# Patient Record
Sex: FEMALE | Race: WHITE | NOT HISPANIC OR LATINO | Employment: FULL TIME | URBAN - METROPOLITAN AREA
[De-identification: names, ages, dates, MRNs, and addresses within clinical notes are randomized per-mention and may not be internally consistent; named-entity substitution may affect disease eponyms.]

---

## 2024-01-31 ENCOUNTER — PATIENT MESSAGE (OUTPATIENT)
Dept: ENDOCRINOLOGY | Facility: CLINIC | Age: 19
End: 2024-01-31
Payer: COMMERCIAL

## 2024-01-31 DIAGNOSIS — Z79.899 TRANSGENDER MAN ON HORMONE THERAPY: Primary | ICD-10-CM

## 2024-01-31 DIAGNOSIS — F64.0 TRANSGENDER MAN ON HORMONE THERAPY: Primary | ICD-10-CM

## 2024-01-31 RX ORDER — TESTOSTERONE CYPIONATE 200 MG/ML
100 INJECTION, SOLUTION INTRAMUSCULAR
Qty: 4 ML | Refills: 5 | OUTPATIENT
Start: 2024-01-31 | End: 2025-01-01

## 2024-02-01 RX ORDER — TESTOSTERONE CYPIONATE 200 MG/ML
100 INJECTION, SOLUTION INTRAMUSCULAR
Qty: 4 ML | Refills: 4 | Status: SHIPPED | OUTPATIENT
Start: 2024-02-01 | End: 2024-03-12 | Stop reason: SDUPTHER

## 2024-03-12 DIAGNOSIS — Z79.899 TRANSGENDER MAN ON HORMONE THERAPY: ICD-10-CM

## 2024-03-12 DIAGNOSIS — F64.0 TRANSGENDER MAN ON HORMONE THERAPY: ICD-10-CM

## 2024-03-13 RX ORDER — TESTOSTERONE CYPIONATE 200 MG/ML
100 INJECTION, SOLUTION INTRAMUSCULAR
Qty: 4 ML | Refills: 2 | Status: SHIPPED | OUTPATIENT
Start: 2024-03-13 | End: 2024-05-13 | Stop reason: SDUPTHER

## 2024-05-13 DIAGNOSIS — Z79.899 TRANSGENDER MAN ON HORMONE THERAPY: ICD-10-CM

## 2024-05-13 DIAGNOSIS — F64.0 TRANSGENDER MAN ON HORMONE THERAPY: ICD-10-CM

## 2024-05-15 ENCOUNTER — PATIENT MESSAGE (OUTPATIENT)
Dept: ENDOCRINOLOGY | Facility: CLINIC | Age: 19
End: 2024-05-15
Payer: COMMERCIAL

## 2024-05-16 RX ORDER — TESTOSTERONE CYPIONATE 200 MG/ML
100 INJECTION, SOLUTION INTRAMUSCULAR
Qty: 4 ML | Refills: 0 | Status: SHIPPED | OUTPATIENT
Start: 2024-05-16 | End: 2024-06-05 | Stop reason: SDUPTHER

## 2024-06-05 ENCOUNTER — OFFICE VISIT (OUTPATIENT)
Dept: ENDOCRINOLOGY | Facility: CLINIC | Age: 19
End: 2024-06-05
Payer: COMMERCIAL

## 2024-06-05 ENCOUNTER — LAB VISIT (OUTPATIENT)
Dept: LAB | Facility: HOSPITAL | Age: 19
End: 2024-06-05
Attending: INTERNAL MEDICINE
Payer: COMMERCIAL

## 2024-06-05 VITALS
DIASTOLIC BLOOD PRESSURE: 68 MMHG | WEIGHT: 113.88 LBS | BODY MASS INDEX: 19.44 KG/M2 | SYSTOLIC BLOOD PRESSURE: 110 MMHG | HEIGHT: 64 IN

## 2024-06-05 DIAGNOSIS — F39 MOOD DISORDER: ICD-10-CM

## 2024-06-05 DIAGNOSIS — F64.0 TRANSGENDER MAN ON HORMONE THERAPY: Primary | ICD-10-CM

## 2024-06-05 DIAGNOSIS — Z79.899 TRANSGENDER MAN ON HORMONE THERAPY: Primary | ICD-10-CM

## 2024-06-05 DIAGNOSIS — Z79.899 TRANSGENDER MAN ON HORMONE THERAPY: ICD-10-CM

## 2024-06-05 DIAGNOSIS — F64.0 TRANSGENDER MAN ON HORMONE THERAPY: ICD-10-CM

## 2024-06-05 LAB
ALBUMIN SERPL BCP-MCNC: 4.4 G/DL (ref 3.5–5.2)
ALP SERPL-CCNC: 64 U/L (ref 55–135)
ALT SERPL W/O P-5'-P-CCNC: 14 U/L (ref 10–44)
ANION GAP SERPL CALC-SCNC: 9 MMOL/L (ref 8–16)
AST SERPL-CCNC: 13 U/L (ref 10–40)
BILIRUB SERPL-MCNC: 0.4 MG/DL (ref 0.1–1)
BUN SERPL-MCNC: 12 MG/DL (ref 6–20)
CALCIUM SERPL-MCNC: 10 MG/DL (ref 8.7–10.5)
CHLORIDE SERPL-SCNC: 105 MMOL/L (ref 95–110)
CO2 SERPL-SCNC: 29 MMOL/L (ref 23–29)
CREAT SERPL-MCNC: 0.9 MG/DL (ref 0.5–1.4)
ERYTHROCYTE [DISTWIDTH] IN BLOOD BY AUTOMATED COUNT: 12.8 % (ref 11.5–14.5)
EST. GFR  (NO RACE VARIABLE): >60 ML/MIN/1.73 M^2
GLUCOSE SERPL-MCNC: 89 MG/DL (ref 70–110)
HCT VFR BLD AUTO: 44.6 % (ref 37–48.5)
HGB BLD-MCNC: 15.2 G/DL (ref 12–16)
MCH RBC QN AUTO: 31.1 PG (ref 27–31)
MCHC RBC AUTO-ENTMCNC: 34.1 G/DL (ref 32–36)
MCV RBC AUTO: 91 FL (ref 82–98)
PLATELET # BLD AUTO: 228 K/UL (ref 150–450)
PMV BLD AUTO: 10.8 FL (ref 9.2–12.9)
POTASSIUM SERPL-SCNC: 3.6 MMOL/L (ref 3.5–5.1)
PROT SERPL-MCNC: 7.2 G/DL (ref 6–8.4)
RBC # BLD AUTO: 4.88 M/UL (ref 4–5.4)
SODIUM SERPL-SCNC: 143 MMOL/L (ref 136–145)
TESTOST SERPL-MCNC: 590 NG/DL (ref 5–73)
WBC # BLD AUTO: 5.52 K/UL (ref 3.9–12.7)

## 2024-06-05 PROCEDURE — 1160F RVW MEDS BY RX/DR IN RCRD: CPT | Mod: CPTII,S$GLB,,

## 2024-06-05 PROCEDURE — 1159F MED LIST DOCD IN RCRD: CPT | Mod: CPTII,S$GLB,,

## 2024-06-05 PROCEDURE — 99214 OFFICE O/P EST MOD 30 MIN: CPT | Mod: S$GLB,,,

## 2024-06-05 PROCEDURE — 3074F SYST BP LT 130 MM HG: CPT | Mod: CPTII,S$GLB,,

## 2024-06-05 PROCEDURE — 85027 COMPLETE CBC AUTOMATED: CPT | Performed by: INTERNAL MEDICINE

## 2024-06-05 PROCEDURE — 99999 PR PBB SHADOW E&M-EST. PATIENT-LVL III: CPT | Mod: PBBFAC,,, | Performed by: INTERNAL MEDICINE

## 2024-06-05 PROCEDURE — 3078F DIAST BP <80 MM HG: CPT | Mod: CPTII,S$GLB,,

## 2024-06-05 PROCEDURE — 36415 COLL VENOUS BLD VENIPUNCTURE: CPT | Performed by: INTERNAL MEDICINE

## 2024-06-05 PROCEDURE — G2211 COMPLEX E/M VISIT ADD ON: HCPCS | Mod: S$GLB,,,

## 2024-06-05 PROCEDURE — 3008F BODY MASS INDEX DOCD: CPT | Mod: CPTII,S$GLB,,

## 2024-06-05 PROCEDURE — 84403 ASSAY OF TOTAL TESTOSTERONE: CPT | Performed by: INTERNAL MEDICINE

## 2024-06-05 PROCEDURE — 80053 COMPREHEN METABOLIC PANEL: CPT | Performed by: INTERNAL MEDICINE

## 2024-06-05 RX ORDER — TESTOSTERONE CYPIONATE 200 MG/ML
100 INJECTION, SOLUTION INTRAMUSCULAR
Qty: 4 ML | Refills: 5 | Status: SHIPPED | OUTPATIENT
Start: 2024-06-05 | End: 2024-06-07 | Stop reason: SDUPTHER

## 2024-06-05 NOTE — PROGRESS NOTES
"Subjective:      Patient ID: Wayne Lanza is a 18 y.o.    Chief Complaint:  gender     History of Present Illness  With regards to the gender incongruence care:    Has had name changed on 's license.    Gender identity - male     Pronouns:  he/him       Goals of therapy:  Deeper voice, increased body hair, increased facial hair    Sexual activity:  men and women          therapist at the time hormones were started: is aware is supportive     Gender Surgeries - none, but interested in mastectomy        Fertility concerns - not  interested    He started testosterone therapy in November of 2022   Current dose   Testosterone 100 mg subcu weekly (Fridays)  He gives himself the injections. He was previously administering in the abdomen, but switched to his legs due to scarring. Reports that scarring has resolved now that he rotates injections.    +voice changes and bottom growth  +mood is good   He is happy is doing this, no regret  Acne has improved    LMP: jan 2023          Social:  He lives in [facility]    Family -  aware and supportive. They are supportive of him using the insurance as long as he pays the costs  Relationship, orientation - in relationship nonbinary AFAB, partner is moving in with him  Dates both men and women     Housing - living in a house near his parent's house      Has been diagnosed with Anxiety and depression   - borderline personality disorder      New concerns today:     None     ROS:   As above    Objective:     /68   Ht 5' 4" (1.626 m)   Wt 51.6 kg (113 lb 13.9 oz)   BMI 19.55 kg/m²     Body mass index is 19.55 kg/m².      Physical Exam  Vitals reviewed.   Constitutional:       Appearance: Normal appearance.   Neck:      Comments: No goiter   Cardiovascular:      Rate and Rhythm: Normal rate.   Pulmonary:      Effort: Pulmonary effort is normal.   Abdominal:      General: Abdomen is flat.      Palpations: Abdomen is soft.      Tenderness: " There is no abdominal tenderness.      Comments: Negative lipohypertrophy, bruising, or scarring   Musculoskeletal:      Right lower leg: No edema.      Left lower leg: No edema.   Psychiatric:         Mood and Affect: Mood normal.       Lab Review:   Last labs were drawn 06/2023 [facility] [place] (scanned in media).    Assessment and Plan     Transgender man on hormone therapy  Transman: gender incongruence   Reviewed therapy, side effects (both wanted and unwanted), possible adverse outcomes, expectations, compliance.      Reviewed the need for contraception as testosterone is not a contraceptive if having vaginal sex with non-trans men         Will continue testosterone replacement therapy 100 mg q 1 week   Labs today      RTC in 12 months with labs   Noting  Nl hh/ for men is:     Hemoglobin 14.0-18.0 g/dL    Hematocrit 40.0-54.0 %        Healthy lifestyle stressed      Reviewed risks and benefits.  ? Possible increase cad  Expected increase in h/h, changes in lipids and body composition   Possible liver effects         Mood disorder  He does not regret doing this and he is excited about the changes      Follow up in about 1 year (around 6/5/2025) for in-person visit..    Vicki Briseno PA-C

## 2024-06-05 NOTE — ASSESSMENT & PLAN NOTE
Transman: gender incongruence   Reviewed therapy, side effects (both wanted and unwanted), possible adverse outcomes, expectations, compliance.      Reviewed the need for contraception as testosterone is not a contraceptive if having vaginal sex with non-trans men         Will continue testosterone replacement therapy 100 mg q 1 week   Labs today      RTC in 12 months with labs   Noting  Nl hh/ for men is:     Hemoglobin 14.0-18.0 g/dL    Hematocrit 40.0-54.0 %        Healthy lifestyle stressed      Reviewed risks and benefits.  ? Possible increase cad  Expected increase in h/h, changes in lipids and body composition   Possible liver effects

## 2024-06-05 NOTE — PROGRESS NOTES
To whom it may concern,     I, Yara Conn MD, am the physician of Wayne Lanza,   YOB: 2005, with whom I have a doctor/patient relationship and with whom I have treated since October of 2022 for gender dysphoria/incongruence.     Wayne Lanza has undergone appropriate and successful clinical treatment for gender transition to the gender of MALE. As these changes from hormonal therapy are irreversible, they should be legally recognized as MALE.     I declare under penalty of perjury under the laws of the [place] that the foregoing is true and correct.    If there are any questions or concerns please contact my office,     Yara Conn MD     [place] #[id number]  Chair of Endocrinology, [facility]  Senior Lecturer, [employer], [facility]. [facility], [address] [place] [address], 6th [place] [address]  O: [phone]  F: [phone]    Email: [email or web address]

## 2024-06-05 NOTE — LETTER
June 5, 2024    Wayne Lanza  [address]  [facility] [address]             [facility] 6th [address]  [address]  [place] [address]  Phone: [phone]  Fax: [phone] To whom it may concern,      I, Yara Conn MD, am the physician of Wayne Lanza,   YOB: 2005, with whom I have a doctor/patient relationship and with whom I have treated since October of 2022 for gender dysphoria/incongruence.      Wayne Lanza has undergone appropriate and successful clinical treatment for gender transition to the gender of MALE. As these changes from hormonal therapy are irreversible, they should be legally recognized as MALE.      I declare under penalty of perjury under the laws of the [place] that the foregoing is true and correct.     If there are any questions or concerns please contact my office,          Yara Conn MD      [place] #[id number]  Chair of Endocrinology, [facility]  Senior Lecturer, [employer], [facility]. [facility], [facility], 6th [place] [address]  O: [phone]  F: [phone]    Email: [email or web address]

## 2024-06-06 ENCOUNTER — TELEPHONE (OUTPATIENT)
Dept: ENDOCRINOLOGY | Facility: CLINIC | Age: 19
End: 2024-06-06
Payer: COMMERCIAL

## 2024-06-06 NOTE — TELEPHONE ENCOUNTER
----- Message from Prachi Rider sent at 6/5/2024 10:01 AM CDT -----  Regarding: Prescription  Contact: [place] [phone]  [facility] is calling to state the pharmacy doesn't have rx: testosterone cypionate (DEPOTESTOTERONE CYPIONATE) 200 mg/mL injection states script needs to be sent to another pharmacy please call     [facility] - [place], [place] - [address], [address]  [address], [address]  [place] [address]  Phone: [phone] Fax: [phone]

## 2024-06-06 NOTE — TELEPHONE ENCOUNTER
Spoke with patient informed him pharmacy requested state they do not carry medication.  Ask him to send a portal message with pharmacy he would like Rx forwarded to. Patient verbalized understanding.

## 2024-06-07 ENCOUNTER — PATIENT MESSAGE (OUTPATIENT)
Dept: ENDOCRINOLOGY | Facility: CLINIC | Age: 19
End: 2024-06-07
Payer: COMMERCIAL

## 2024-06-07 DIAGNOSIS — Z79.899 TRANSGENDER MAN ON HORMONE THERAPY: ICD-10-CM

## 2024-06-07 DIAGNOSIS — F64.0 TRANSGENDER MAN ON HORMONE THERAPY: ICD-10-CM

## 2024-06-10 RX ORDER — TESTOSTERONE CYPIONATE 200 MG/ML
100 INJECTION, SOLUTION INTRAMUSCULAR
Qty: 4 ML | Refills: 5 | Status: SHIPPED | OUTPATIENT
Start: 2024-06-10 | End: 2025-05-12

## 2024-06-22 DIAGNOSIS — F64.0 TRANSGENDER MAN ON HORMONE THERAPY: ICD-10-CM

## 2024-06-22 DIAGNOSIS — Z79.899 TRANSGENDER MAN ON HORMONE THERAPY: ICD-10-CM

## 2024-06-25 RX ORDER — TESTOSTERONE CYPIONATE 200 MG/ML
100 INJECTION, SOLUTION INTRAMUSCULAR
Qty: 4 ML | Refills: 5 | Status: SHIPPED | OUTPATIENT
Start: 2024-06-25 | End: 2025-05-27

## 2024-08-17 DIAGNOSIS — F64.0 TRANSGENDER MAN ON HORMONE THERAPY: ICD-10-CM

## 2024-08-17 DIAGNOSIS — Z79.899 TRANSGENDER MAN ON HORMONE THERAPY: ICD-10-CM

## 2024-08-19 RX ORDER — TESTOSTERONE CYPIONATE 200 MG/ML
100 INJECTION, SOLUTION INTRAMUSCULAR
Qty: 4 ML | Refills: 5 | Status: SHIPPED | OUTPATIENT
Start: 2024-08-19 | End: 2025-07-21

## 2024-08-31 DIAGNOSIS — F64.0 TRANSGENDER MAN ON HORMONE THERAPY: ICD-10-CM

## 2024-08-31 DIAGNOSIS — Z79.899 TRANSGENDER MAN ON HORMONE THERAPY: ICD-10-CM

## 2024-09-04 RX ORDER — TESTOSTERONE CYPIONATE 200 MG/ML
100 INJECTION, SOLUTION INTRAMUSCULAR
Qty: 4 ML | Refills: 5 | Status: SHIPPED | OUTPATIENT
Start: 2024-09-04 | End: 2025-08-06

## 2024-11-18 DIAGNOSIS — F64.0 TRANSGENDER MAN ON HORMONE THERAPY: ICD-10-CM

## 2024-11-18 DIAGNOSIS — Z79.899 TRANSGENDER MAN ON HORMONE THERAPY: ICD-10-CM

## 2024-11-19 RX ORDER — TESTOSTERONE CYPIONATE 200 MG/ML
100 INJECTION, SOLUTION INTRAMUSCULAR
Qty: 4 ML | Refills: 5 | Status: SHIPPED | OUTPATIENT
Start: 2024-11-19 | End: 2025-10-21

## 2025-03-01 DIAGNOSIS — Z79.899 TRANSGENDER MAN ON HORMONE THERAPY: ICD-10-CM

## 2025-03-01 DIAGNOSIS — F64.0 TRANSGENDER MAN ON HORMONE THERAPY: ICD-10-CM

## 2025-03-03 RX ORDER — TESTOSTERONE CYPIONATE 200 MG/ML
100 INJECTION, SOLUTION INTRAMUSCULAR
Qty: 4 ML | Refills: 5 | Status: SHIPPED | OUTPATIENT
Start: 2025-03-03 | End: 2026-02-02

## 2025-04-08 ENCOUNTER — PATIENT MESSAGE (OUTPATIENT)
Dept: ENDOCRINOLOGY | Facility: CLINIC | Age: 20
End: 2025-04-08
Payer: COMMERCIAL

## 2025-07-07 ENCOUNTER — LAB VISIT (OUTPATIENT)
Dept: LAB | Facility: HOSPITAL | Age: 20
End: 2025-07-07
Attending: INTERNAL MEDICINE
Payer: COMMERCIAL

## 2025-07-07 ENCOUNTER — OFFICE VISIT (OUTPATIENT)
Dept: ENDOCRINOLOGY | Facility: CLINIC | Age: 20
End: 2025-07-07
Payer: COMMERCIAL

## 2025-07-07 VITALS
SYSTOLIC BLOOD PRESSURE: 122 MMHG | BODY MASS INDEX: 22.4 KG/M2 | DIASTOLIC BLOOD PRESSURE: 70 MMHG | WEIGHT: 131.19 LBS | HEIGHT: 64 IN

## 2025-07-07 DIAGNOSIS — Z79.899 TRANSGENDER MAN ON HORMONE THERAPY: ICD-10-CM

## 2025-07-07 DIAGNOSIS — F64.0 TRANSGENDER MAN ON HORMONE THERAPY: ICD-10-CM

## 2025-07-07 LAB
ALBUMIN SERPL BCP-MCNC: 4.6 G/DL (ref 3.5–5.2)
ALP SERPL-CCNC: 57 UNIT/L (ref 40–150)
ALT SERPL W/O P-5'-P-CCNC: 98 UNIT/L (ref 10–44)
ANION GAP (OHS): 8 MMOL/L (ref 8–16)
AST SERPL-CCNC: 140 UNIT/L (ref 11–45)
BILIRUB SERPL-MCNC: 0.4 MG/DL (ref 0.1–1)
BUN SERPL-MCNC: 10 MG/DL (ref 6–20)
CALCIUM SERPL-MCNC: 9.1 MG/DL (ref 8.7–10.5)
CHLORIDE SERPL-SCNC: 104 MMOL/L (ref 95–110)
CO2 SERPL-SCNC: 26 MMOL/L (ref 23–29)
CREAT SERPL-MCNC: 0.7 MG/DL (ref 0.5–1.4)
ERYTHROCYTE [DISTWIDTH] IN BLOOD BY AUTOMATED COUNT: 12.3 % (ref 11.5–14.5)
GFR SERPLBLD CREATININE-BSD FMLA CKD-EPI: >60 ML/MIN/1.73/M2
GLUCOSE SERPL-MCNC: 98 MG/DL (ref 70–110)
HCT VFR BLD AUTO: 41.4 % (ref 40–54)
HGB BLD-MCNC: 13.8 GM/DL (ref 14–18)
MCH RBC QN AUTO: 30.3 PG (ref 27–31)
MCHC RBC AUTO-ENTMCNC: 33.3 G/DL (ref 32–36)
MCV RBC AUTO: 91 FL (ref 82–98)
PLATELET # BLD AUTO: 225 K/UL (ref 150–450)
PMV BLD AUTO: 11.1 FL (ref 9.2–12.9)
POTASSIUM SERPL-SCNC: 4 MMOL/L (ref 3.5–5.1)
PROT SERPL-MCNC: 6.7 GM/DL (ref 6–8.4)
RBC # BLD AUTO: 4.55 M/UL (ref 4.6–6.2)
SODIUM SERPL-SCNC: 138 MMOL/L (ref 136–145)
WBC # BLD AUTO: 5.07 K/UL (ref 3.9–12.7)

## 2025-07-07 PROCEDURE — 85027 COMPLETE CBC AUTOMATED: CPT

## 2025-07-07 PROCEDURE — G2211 COMPLEX E/M VISIT ADD ON: HCPCS | Mod: S$GLB,,, | Performed by: INTERNAL MEDICINE

## 2025-07-07 PROCEDURE — 36415 COLL VENOUS BLD VENIPUNCTURE: CPT

## 2025-07-07 PROCEDURE — 3074F SYST BP LT 130 MM HG: CPT | Mod: CPTII,S$GLB,, | Performed by: INTERNAL MEDICINE

## 2025-07-07 PROCEDURE — 3078F DIAST BP <80 MM HG: CPT | Mod: CPTII,S$GLB,, | Performed by: INTERNAL MEDICINE

## 2025-07-07 PROCEDURE — 3008F BODY MASS INDEX DOCD: CPT | Mod: CPTII,S$GLB,, | Performed by: INTERNAL MEDICINE

## 2025-07-07 PROCEDURE — 1160F RVW MEDS BY RX/DR IN RCRD: CPT | Mod: CPTII,S$GLB,, | Performed by: INTERNAL MEDICINE

## 2025-07-07 PROCEDURE — 99214 OFFICE O/P EST MOD 30 MIN: CPT | Mod: S$GLB,,, | Performed by: INTERNAL MEDICINE

## 2025-07-07 PROCEDURE — 84460 ALANINE AMINO (ALT) (SGPT): CPT

## 2025-07-07 PROCEDURE — 1159F MED LIST DOCD IN RCRD: CPT | Mod: CPTII,S$GLB,, | Performed by: INTERNAL MEDICINE

## 2025-07-07 PROCEDURE — 99999 PR PBB SHADOW E&M-EST. PATIENT-LVL III: CPT | Mod: PBBFAC,,, | Performed by: INTERNAL MEDICINE

## 2025-07-07 RX ORDER — TESTOSTERONE CYPIONATE 200 MG/ML
100 INJECTION, SOLUTION INTRAMUSCULAR
Qty: 4 ML | Refills: 5 | Status: SHIPPED | OUTPATIENT
Start: 2025-07-07 | End: 2026-06-08

## 2025-07-07 NOTE — PROGRESS NOTES
"Subjective:      Patient ID: Wayne Lanza is a 19 y.o.    Chief Complaint:  gender - last in clinic visit 07/07/2025      History of Present Illness  With regards to the gender incongruence care:    Has had name changed on 's license.  Has not changed gender marker -- wants to wait until he is 20      Gender identity - male     Pronouns:  he/him       Goals of therapy:  Deeper voice, increased body hair, increased facial hair    Sexual activity:  men and women          therapist at the time hormones were started: is aware is supportive     Gender Surgeries - none, but interested in mastectomy        Fertility concerns - not  interested    He started testosterone therapy in November of 2022   Current dose   Testosterone 100 mg subcu weekly (Fridays)  He gives himself the injection      +voice changes and bottom growth  +mood is good   He is happy is doing this, no regret  Acne has improved    LMP: jan 2023          Social:  He lives in [facility] [place]    Family -  aware and supportive.     Relationship, orientation - in relationship nonbinary AFAB    Dates both men and women   -- is aware of the need to use protection if having sexual activity with a person assigned male at birth    Housing - living in a house near his parent's house      Has been diagnosed with Anxiety and depression   - borderline personality disorder      New concerns today:     None     ROS:   As above    Objective:     /70 (BP Location: Right arm, Patient Position: Sitting)   Ht 5' 4" (1.626 m)   Wt 59.5 kg (131 lb 2.8 oz)   BMI 22.52 kg/m²     Body mass index is 22.52 kg/m².      Physical Exam  Vitals reviewed.   Constitutional:       Appearance: Normal appearance.   Neck:      Comments: No goiter   Cardiovascular:      Rate and Rhythm: Normal rate.   Pulmonary:      Effort: Pulmonary effort is normal.   Abdominal:      Palpations: Abdomen is soft.   Musculoskeletal:      Right lower leg: No edema. "      Left lower leg: No edema.   Psychiatric:         Mood and Affect: Mood normal.       Lab Review:   None     Assessment and Plan     Transgender man on hormone therapy  Transman: gender incongruence   Reviewed therapy, side effects (both wanted and unwanted), possible adverse outcomes, expectations, compliance.      Reviewed the need for contraception as testosterone is not a contraceptive if having vaginal sex with non-trans men         Will continue testosterone replacement therapy 100 mg q 1 week   Will notify him once I see recent labs      RTC in 12 months with labs   Noting  Nl hh/ for men is:     Hemoglobin 14.0-18.0 g/dL    Hematocrit 40.0-54.0 %        Healthy lifestyle stressed      Reviewed risks and benefits.  ? Possible increase cad  Expected increase in h/h, changes in lipids and body composition   Possible liver effects

## 2025-07-07 NOTE — ASSESSMENT & PLAN NOTE
Transman: gender incongruence   Reviewed therapy, side effects (both wanted and unwanted), possible adverse outcomes, expectations, compliance.      Reviewed the need for contraception as testosterone is not a contraceptive if having vaginal sex with non-trans men         Will continue testosterone replacement therapy 100 mg q 1 week   Will notify him once I see recent labs      RTC in 12 months with labs   Noting  Nl hh/ for men is:     Hemoglobin 14.0-18.0 g/dL    Hematocrit 40.0-54.0 %        Healthy lifestyle stressed      Reviewed risks and benefits.  ? Possible increase cad  Expected increase in h/h, changes in lipids and body composition   Possible liver effects

## 2025-07-22 ENCOUNTER — TELEPHONE (OUTPATIENT)
Dept: ENDOCRINOLOGY | Facility: CLINIC | Age: 20
End: 2025-07-22
Payer: COMMERCIAL

## 2025-07-22 NOTE — TELEPHONE ENCOUNTER
Copied from CRM #[id number]. Topic: Medications - Medication Status Check   >> Jul 14, 2025  1:49 PM Serg wrote:  Consult/Advisory     Name Of Caller:[employer]        Contact Preference:[phone](FAX)     Nature of call:  f/u on PA sent on 7/7 for rx testosterone cypionate (DEPOTESTOTERONE CYPIONATE) 200 mg/mL injection. Please call to advise thank you  >> Jul 22, 2025  2:20 PM MONALISA Dickens wrote:

## 2025-07-22 NOTE — TELEPHONE ENCOUNTER
Prior authorization for testosterone cypionate submitted 7/22/2025 via [employer]  Ticket Number: [id number]  For status updates, call [phone]    Status: Approved 7/22/2025-7/22/2026 (scanned in media tab)

## 2025-07-28 DIAGNOSIS — F64.0 TRANSGENDER MAN ON HORMONE THERAPY: ICD-10-CM

## 2025-07-28 DIAGNOSIS — Z79.899 TRANSGENDER MAN ON HORMONE THERAPY: ICD-10-CM

## 2025-07-30 RX ORDER — TESTOSTERONE CYPIONATE 200 MG/ML
100 INJECTION, SOLUTION INTRAMUSCULAR
Qty: 4 ML | Refills: 5 | OUTPATIENT
Start: 2025-07-30 | End: 2026-07-01